# Patient Record
Sex: FEMALE | Race: BLACK OR AFRICAN AMERICAN | URBAN - METROPOLITAN AREA
[De-identification: names, ages, dates, MRNs, and addresses within clinical notes are randomized per-mention and may not be internally consistent; named-entity substitution may affect disease eponyms.]

---

## 2018-01-22 ENCOUNTER — HISTORICAL (OUTPATIENT)
Dept: ADMINISTRATIVE | Facility: HOSPITAL | Age: 78
End: 2018-01-22

## 2018-01-30 ENCOUNTER — HISTORICAL (OUTPATIENT)
Dept: ADMINISTRATIVE | Facility: HOSPITAL | Age: 78
End: 2018-01-30

## 2018-05-09 ENCOUNTER — HISTORICAL (OUTPATIENT)
Dept: ADMINISTRATIVE | Facility: HOSPITAL | Age: 78
End: 2018-05-09

## 2019-01-21 ENCOUNTER — HISTORICAL (OUTPATIENT)
Dept: ADMINISTRATIVE | Facility: HOSPITAL | Age: 79
End: 2019-01-21

## 2022-04-07 ENCOUNTER — HISTORICAL (OUTPATIENT)
Dept: ADMINISTRATIVE | Facility: HOSPITAL | Age: 82
End: 2022-04-07

## 2022-04-23 VITALS
SYSTOLIC BLOOD PRESSURE: 124 MMHG | WEIGHT: 189.63 LBS | HEIGHT: 61 IN | BODY MASS INDEX: 35.8 KG/M2 | DIASTOLIC BLOOD PRESSURE: 71 MMHG

## 2022-05-02 NOTE — HISTORICAL [facility]
This is a historical note converted from John. Formatting and pictures may have been removed.  Please reference John for original formatting and attached multimedia. Chief Complaint  Right shoulder pain-no injury  History of Present Illness  She is a pleasant 77-year-old right-hand-dominant female?who has had a one-year history?of right shoulder pain. ?The pain starts around the neck goes down the shoulder itself?down her arm into her fingers. ?There is no associated numbness or tingling.? She does have increased pain when she is moving the shoulder.? She is tried a steroid injection within the shoulder itself. ?She has tried a host of medications?including?prescription strength. ?She is tried physical therapy without significant relief. ?She status post?MRI of her cervical spine.  Review of Systems  Comprehensive review of system?was performed with no exceptions other than noted in the history of present illness  Physical Exam  Vitals & Measurements  BP:?140/100?  HT:?156?cm? HT:?156?cm? WT:?86.18?kg? WT:?86.18?kg? BMI:?35.41?  Gen: WN, WD, NAD  Card/Res: NL breathing, +distal pulses  Abdomen: ND  Shoulder Exam:??????????Right??????????Left  Skin:??????????????????????????????Normal???????Normal  AC joint tenderness:??????????None??????????None  Forward Flexion:?????????????120 ??????????180  Abduction:?????????????????????100??????????? 180  External Rotation:?????????????? 50??????????????80  Internal Rotation?????????????? 80 ???????????? 80  Supraspinatus stress test???????+??????????Neg  Aguilar Impingement:?? ?????Neg ?????????? ?Neg  Neer Impingement:????????????? +??????????Neg  Apprehension:???????????????????? Neg??????????Neg  OBriens:????????????????????????????+????????? Neg  Speeds test:??????????????????????? Neg??????????Neg  Strength:  External Rotation:???????????????5/5???????????????5/5  Lift Off/belly press:????????????5/5???????????????5/5  ?   N-V  status:?????????????????????????Intact??????????Intact  ?   C-spine: Normal ROM, NT  ?  ?  Assessment/Plan  1.?Cervical radiculopathy  ? She has both cervical radiculopathy and a probable rotator cuff tear. ?We will get a MRI of her shoulder to confirm the diagnosis. ?Also get her set up with Dr. Michael?for a?steroid injection to the neck.  Ordered:  Clinic Follow up, *Est. 02/12/18 3:00:00 CST, Order for future visit, Cervical radiculopathy  Rotator cuff tear, [facility]  Office/Outpatient Visit Level 3 New [id number] PC, Cervical radiculopathy  Rotator cuff tear, [facility], 01/22/18 14:37:00 CST  ?  2.?Rotator cuff tear  Ordered:  Clinic Follow up, *Est. 02/12/18 3:00:00 CST, Order for future visit, Cervical radiculopathy  Rotator cuff tear, [facility]  MRI Ext Upper Joint Right W/O Contrast, Routine, 01/22/18 14:37:00 CST, Rotator Cuff Syndrome, None, Ambulatory, Rad Type, Order for future visit, Rotator cuff tear, Schedule this test, [facility], 01/22/18 14:37:00 CST  Office/Outpatient Visit Level 3 New [id number] PC, Cervical radiculopathy  Rotator cuff tear, [facility], 01/22/18 14:37:00 CST  ?  Right shoulder pain  Ordered:  XR Shoulder Right Minimum 2 Views, Routine, 01/22/18 14:04:00 CST, Pain, None, Ambulatory, AP, Lat, Axil, Scap Y, Rad Type, Right shoulder pain, Not Scheduled, 01/22/18 14:04:00 CST  ?   Problem List/Past Medical History  Ongoing  Obesity  Historical  Procedure/Surgical History  Foot  Tonsil  Medications  No active medications  Allergies  No Known Medication Allergies  Social History  Alcohol - 01/22/2018  Never  Substance Abuse - 01/22/2018  Never  Tobacco - 01/22/2018  Former smoker  Diagnostic Results  Shoulder radiographs 1/22/2018:?4 views of the shoulder show no arthrosis

## 2022-05-02 NOTE — HISTORICAL [facility]
This is a historical note converted from John. Formatting and pictures may have been removed.  Please reference John for original formatting and attached multimedia. Chief Complaint  Est patient c/o Right shoul pain that goes down into arm. S/p Rt shoul RTC rep. 2/9/18. Xrays Today.  History of Present Illness  2/9/2018:  1.?Right shoulder arthroscopic rotator cuff repair  2.?Right shoulder arthroscopic biceps tenotomy  3.?Right shoulder arthroscopic subacromial decompression  ?   She returns today . ?She is having some pain around the shoulder?that occasionally goes down the arm. ?She has good days and bad days. ?She is better than where she was before surgery.? She was previously taken diclofenac with good relief but is run out.  Review of Systems  Comprehensive review of system?was performed with no exceptions other than noted in the history of present illness [2]  Physical Exam  Vitals & Measurements  BP:?124/71?  HT:?154?cm? HT:?154?cm? HT:?154?cm? WT:?86?kg? WT:?86?kg? WT:?86?kg? BMI:?36.26?  Gen: WN, WD, NAD  Card/Res: NL breathing, +distal pulses  Abdomen: ND  Shoulder Exam:??????????Right??????????Left  Skin:??????????????????????????????Normal???????Normal  AC joint tenderness:??????????None??????????None  Forward Flexion:?????????????170 ??????????180  Abduction:?????????????????????130??????????? 180  External Rotation: ????????????? 80??????????????80  Internal Rotation?????????????? 80 ???????????? 80  Supraspinatus stress test?????? Neg??????????Neg  Aguilar Impingement:?? ?????Neg ?????????? ?Neg  Neer Impingement:?????????????+??????????Neg  Apprehension:???????????????????? Neg??????????Neg  OBriens:????????????????????????????Neg????????? Neg  Speeds test:??????????????????????? Neg??????????Neg  Strength:  External Rotation:???????????????5/5???????????????5/5  Lift Off/belly press:????????????5/5???????????????5/5  ?   N-V status:?????????????????????????Intact??????????Intact  ?   C-spine:  Normal ROM, NT  ?  ?  Assessment/Plan  1.?S/P rotator cuff repair?Z98.890  Ordered:  Office/Outpatient Visit Level 3 Established [id number] PC, S/P rotator cuff repair  Shoulder impingement, [facility], 01/21/19 11:08:00 CST  ?  2.?Shoulder impingement?M75.40  ? We will get her back on her diclofenac. ?I will see her back she has any issues  Ordered:  Office/Outpatient Visit Level 3 Established [id number] PC, S/P rotator cuff repair  Shoulder impingement, [facility], 01/21/19 11:08:00 CST  ?  Orders:  diclofenac, 100 mg = 1 tab(s), Oral, Daily, # 30 tab(s), 3 Refill(s), [facility] Follow-up PRN, 01/21/19 11:08:00 CST, Future Order, [facility]   Problem List/Past Medical History  Ongoing  Acid reflux  Anxiety  DM - Diabetes mellitus  HTN - Hypertension  Hyperlipidemia  Obesity  S/P rotator cuff repair  Urinary frequency  Historical  No qualifying data  Procedure/Surgical History  Arthroscopy Shoulder (Right) (02/09/2018)  Foot  Tonsil   Medications  ALENDRONATE TAB 70MG  ALPRAZOLAM TAB 0.25MG, 0.25 mg= 1 tab(s), Oral, Daily, PRN  AMLODIPINE TAB 10MG, 10 mg= 1 tab(s), Oral, Daily  aspirin 325 mg oral tablet, 325 mg= 1 tab(s), Oral, Daily  BACLOFEN TAB 20MG, 20 mg= 1 tab(s), Oral, qPM  diclofenac sodium 100 mg oral tablet, extended release, 100 mg= 1 tab(s), Oral, Daily, 3 refills  GABAPENTIN TAB 600MG, 300 mg= 0.5 tab(s), Oral, qPM  HYDROCHLOROT TAB 25MG, 25 mg= 1 tab(s), Oral, Daily  LOSARTAN POT TAB 50MG, 50 mg= 1 tab(s), Oral, Daily  LOVASTATIN TAB 40MG, 40 mg= 1 tab(s), Oral, qPM  OMEPRAZOLE CAP 20MG, 20 mg= 1 cap(s), Oral, BID  Vitamin D 1,000 Units Tab, 1 tab(s), Oral, Daily  Voltaren- mg oral tablet, extended release, 100 mg= 1 tab(s), Oral, Daily  Allergies  No Known Medication Allergies  Social History  Alcohol  Never, 01/22/2018  Substance Abuse  Never, 01/22/2018  Tobacco  Former smoker, quit more than 30 days ago, No, 01/21/2019  Former smoker, Stopped  age 16 Years., 02/05/2018  Family History  Family history is negative  Health Maintenance  Health Maintenance  ???Pending?(in the next year)  ??? ??Due?  ??? ? ? ?ADL Screening due??01/21/19??and every 1??year(s)  ??? ? ? ?Bone Density Screening due??01/21/19??Variable frequency  ??? ? ? ?Cognitive Screening due??01/21/19??and every 1??year(s)  ??? ? ? ?Diabetes Maintenance-Medication Prescribed due??01/21/19??and every 1??year(s)  ??? ? ? ?Diabetes Maintenance-Microalbumin due??01/21/19??Variable frequency  ??? ? ? ?Diabetes Maintenance-Urine Dipstick due??01/21/19??Variable frequency  ??? ? ? ?Diabetes Maintenance-Serum Creatinine due??01/21/19??Variable frequency  ??? ? ? ?Diabetes Maintenance-Eye Exam due??01/21/19??and every?  ??? ? ? ?Diabetes Maintenance-Foot Exam due??01/21/19??and every?  ??? ? ? ?Diabetes Maintenance-HgbA1c due??01/21/19??and every?  ??? ? ? ?Diabetes Maintenance-Fasting Lipid Profile due??01/21/19??and every?  ??? ? ? ?Functional Assessment due??01/21/19??and every 1??year(s)  ??? ? ? ?Geriatric Depression Screening due??01/21/19??and every 1??year(s)  ??? ? ? ?Hypertension Management-Education due??01/21/19??and every 1??year(s)  ??? ? ? ?Hypertension Management-BMP due??01/21/19??and every?  ??? ? ? ?Pneumococcal Vaccine due??01/21/19??Variable frequency  ??? ? ? ?Pneumococcal Vaccine due??01/21/19??and every?  ??? ? ? ?Smoking Cessation due??01/21/19??Variable frequency  ??? ? ? ?Tetanus Vaccine due??01/21/19??and every 10??year(s)  ??? ? ? ?Zoster Vaccine due??01/21/19??and every 100??year(s)  ??? ??Due In Future?  ??? ? ? ?Advance Directive not due until??02/05/19??and every 1??year(s)  ??? ? ? ?Aspirin Therapy for CVD Prevention not due until??02/05/19??and every 1??year(s)  ??? ? ? ?Hypertension Management-Blood Pressure not due until??08/08/19??and every 1??year(s)  ???Satisfied?(in the past 1 year)  ??? ??Satisfied?  ??? ? ? ?Advance Directive on??02/05/18.??Satisfied by Maikel CUMMINGS,  Sandi JUSTICE  ??? ? ? ?Aspirin Therapy for CVD Prevention on??02/05/18.  ??? ? ? ?Blood Pressure Screening on??01/21/19.??Satisfied by Amanda Rice L. L.  ??? ? ? ?Body Mass Index Check on??01/21/19.??Satisfied by Krystal, Amanda L. L.  ??? ? ? ?Depression Screening on??01/21/19.??Satisfied by Krystal, Amanda L. L.  ??? ? ? ?Fall Risk Assessment on??01/21/19.??Satisfied by Krystal, Amanda L. L.  ??? ? ? ?Hypertension Management-Blood Pressure on??01/21/19.??Satisfied by Amanda Rice L. L.  ??? ? ? ?Influenza Vaccine on??01/21/19.??Satisfied by Krystal, Amanda L. L.  ??? ? ? ?Obesity Screening on??01/21/19.??Satisfied by Amanda Rice L. L.  ?  ?  Diagnostic Results  Shoulder radiographs 1/21/2019: 4 views of the shoulder show minimal arthrosis     [1]?Office Visit Note; Bebeto Heller JR., MD 08/08/2018 11:04 CDT  [2]?Office Visit Note; Bebeto Heller JR., MD 08/08/2018 11:04 CDT

## 2022-05-02 NOTE — HISTORICAL [facility]
This is a historical note converted from John. Formatting and pictures may have been removed.  Please reference John for original formatting and attached multimedia. Chief Complaint  Est patient here c/o Left knee pain. No injury. Overtime pain. Xrays today.  History of Present Illness  2/9/2018:  1.?Right shoulder arthroscopic rotator cuff repair  2.?Right shoulder arthroscopic biceps tenotomy  3.?Right shoulder arthroscopic subacromial decompression  ?   She returns today with a new complaint. ?She has had?continued left knee pain. ?The pains localized?anteriorly over the kneecap itself. ?She notes it worse with movement. ?It somewhat better with rest.? She?is tried?Mobic and ibuprofen in the past. ?She had an injection about 2 years ago?with limited relief. ?She is been working on some exercises on her own and some that she learned in therapy.? In regards to her shoulder pains improved.  Review of Systems  Comprehensive review of system?was performed with no exceptions other than noted in the history of present illness  Physical Exam  Vitals & Measurements  BP:?124/71?  HT:?154?cm? HT:?154?cm? HT:?154?cm? WT:?86?kg? WT:?86?kg? WT:?86?kg? BMI:?36.26?  Gen: WN, WD, NAD  Card/Res: NL breathing, +distal pulses  Abdomen: ND  Standing exam  stance: normal alignment, no significant leg-length discrepancy  gait:?Antalgic limp  ?   Knee examination  - General comments: unremarkable appearance  ?   - Tenderness: Parapatellar  ?   Knee??????????RIGHT??????????LEFT  Skin: ??????????Intact ??????????Intact  ROM:??????????0-130??????????0-110  Effusion:????? Neg????????????? +  MJL TTP:????? Neg????????????? +  LJL TTP: ?????? Neg ???????????? Neg  Killian:? ?Neg??????????????? +  Pat crep:?????? Neg ???????????????Neg  Patella TTPs: Neg????????????????+  Patella grind: Neg??????????? ?Neg  Lachman: ?????Neg ????????????????????Neg  Pivot shift: ?????Neg ???????????? Neg  Valgus stress: Neg ???????????????Neg  Varus  stress: Neg ???????????????Neg  Posterior drawer: Neg ??????????Neg  ?   N-V ????????????????????intact??????????intact  Hip:?????????????????????????nml?????????? nml  ?   Lower extremity edema:Negative  ?   Shoulder Exam:??????????Right??????????Left  Skin:??????????????????????????????Normal???????Normal  AC joint tenderness:??????????None??????????None  Forward Flexion:?????????????150 ??????????180  Abduction:?????????????????????140??????????? 180  External Rotation: ????????????? 80??????????????80  Internal Rotation?????????????? 80 ???????????? 80  Supraspinatus stress test?????? Neg??????????Neg  Aguilar Impingement:?? ?????Neg ?????????? ?Neg  Neer Impingement:?????????????Neg??????????Neg  Apprehension:???????????????????? Neg??????????Neg  OBriens:????????????????????????????Neg????????? Neg  Speeds test:??????????????????????? Neg??????????Neg  Strength:  External Rotation:???????????????5/5???????????????5/5  Lift Off/belly press:????????????5/5???????????????5/5  ?   N-V status:?????????????????????????Intact??????????Intact  ?  C-spine: Normal ROM, NT  ?  DASH:  1/31/2018: 61  5/9/2018: 81  Assessment/Plan  1.?OA (osteoarthritis) of knee  ? We will switch up her anti-inflammatory medicines. ?If her pain persists we consider an injection  Ordered:  Office/Outpatient Visit Level 3 Established [id number] PC, OA (osteoarthritis) of knee, [facility], 05/09/18 10:15:00 CDT  ?  Orders:  diclofenac, 100 mg = 1 tab(s), Oral, Daily, # 30 tab(s), 0 Refill(s), Pharmacy: [facility] Follow-up PRN, 05/09/18 10:15:00 CDT, Future Order, [facility]  XR Knee Left 3 Views, Routine, 05/09/18 9:49:00 CDT, Pain, None, Patient Bed, Patient Has IV?, Rad Type, Left knee pain, Not Scheduled, 05/09/18 9:49:00 CDT   Problem List/Past Medical History  Ongoing  Acid reflux  Anxiety  DM - Diabetes mellitus  HTN - Hypertension  Hyperlipidemia  Obesity  S/P rotator cuff  repair  Urinary frequency  Historical  No qualifying data  Procedure/Surgical History  Arthroscopy Shoulder (Right) (02/09/2018), Arthroscopy, shoulder, surgical; biceps tenodesis (02/09/2018), Arthroscopy, shoulder, surgical; decompression of subacromial space with partial acromioplasty, with coracoacromial ligament (ie, arch) release, when performed (List separately in addition to code for primary procedure) (02/09/2018), Arthroscopy, shoulder, surgical; with rotator cuff repair (02/09/2018), Injection, anesthetic agent; brachial plexus, single (02/09/2018), Introduction of Anesthetic Agent into Peripheral Nerves and Plexi, Percutaneous Approach (02/09/2018), Release Right Shoulder Joint, Percutaneous Endoscopic Approach (02/09/2018), Repair Right Shoulder Tendon, Percutaneous Endoscopic Approach (02/09/2018), Reposition Right Shoulder Tendon, Percutaneous Endoscopic Approach (02/09/2018), Foot, Tonsil.  Medications  ALPRAZOLAM TAB 0.25MG, 0.25 mg= 1 tab(s), Oral, Daily, PRN  AMLODIPINE TAB 10MG, 10 mg= 1 tab(s), Oral, Daily  aspirin 325 mg oral tablet, 325 mg= 1 tab(s), Oral, Daily  BACLOFEN TAB 20MG, 20 mg= 1 tab(s), Oral, qPM,? ?Not taking  GABAPENTIN TAB 600MG, 300 mg= 0.5 tab(s), Oral, qPM  HYDROCHLOROT TAB 25MG, 25 mg= 1 tab(s), Oral, Daily  LOSARTAN POT TAB 50MG, 50 mg= 1 tab(s), Oral, Daily  LOVASTATIN TAB 40MG, 40 mg= 1 tab(s), Oral, qPM  OMEPRAZOLE CAP 20MG, 20 mg= 1 cap(s), Oral, BID  Vitamin D 1,000 Units Tab, 1 tab(s), Oral, Daily  Voltaren- mg oral tablet, extended release, 100 mg= 1 tab(s), Oral, Daily  Allergies  No Known Medication Allergies  Social History  Alcohol  Never, 01/22/2018  Substance Abuse  Never, 01/22/2018  Tobacco  Former smoker, Stopped age 16 Years., 02/05/2018  Family History  Family history is negative  Diagnostic Results  Knee radiographs 5/9/2018: 3 views the knee show arthrosis?on the medial side and also?in the patellofemoral compartment     [1]?Office Visit Note;  Pina FARFAN MD, Bebeto PEÑA 01/31/2018 15:20 CST